# Patient Record
Sex: MALE | Race: WHITE | NOT HISPANIC OR LATINO | Employment: UNEMPLOYED | URBAN - METROPOLITAN AREA
[De-identification: names, ages, dates, MRNs, and addresses within clinical notes are randomized per-mention and may not be internally consistent; named-entity substitution may affect disease eponyms.]

---

## 2017-01-30 ENCOUNTER — CLINICAL SUPPORT (OUTPATIENT)
Dept: SMOKING CESSATION | Facility: CLINIC | Age: 47
End: 2017-01-30
Payer: COMMERCIAL

## 2017-01-30 DIAGNOSIS — F17.200 NICOTINE DEPENDENCE: Primary | ICD-10-CM

## 2017-01-30 PROCEDURE — 99407 BEHAV CHNG SMOKING > 10 MIN: CPT | Mod: S$GLB,,,

## 2017-08-14 ENCOUNTER — CLINICAL SUPPORT (OUTPATIENT)
Dept: SMOKING CESSATION | Facility: CLINIC | Age: 47
End: 2017-08-14
Payer: COMMERCIAL

## 2017-08-14 DIAGNOSIS — F17.200 NICOTINE DEPENDENCE: Primary | ICD-10-CM

## 2017-08-14 PROCEDURE — 99407 BEHAV CHNG SMOKING > 10 MIN: CPT | Mod: S$GLB,,,

## 2017-08-14 NOTE — PROGRESS NOTES
Pt not ready to return to the program at this time. Pt would like to be called next week to schedule an appointment. Will resolve this episode.

## 2017-10-19 ENCOUNTER — CLINICAL SUPPORT (OUTPATIENT)
Dept: SMOKING CESSATION | Facility: CLINIC | Age: 47
End: 2017-10-19
Payer: COMMERCIAL

## 2017-10-19 DIAGNOSIS — F17.210 MODERATE SMOKER (20 OR LESS PER DAY): Primary | ICD-10-CM

## 2017-10-19 PROCEDURE — 99999 PR PBB SHADOW E&M-EST. PATIENT-LVL I: CPT | Mod: PBBFAC,,,

## 2017-10-19 PROCEDURE — 99404 PREV MED CNSL INDIV APPRX 60: CPT | Mod: S$GLB,,,

## 2017-10-19 RX ORDER — BUPROPION HYDROCHLORIDE 150 MG/1
150 TABLET, EXTENDED RELEASE ORAL 2 TIMES DAILY
Qty: 60 TABLET | Refills: 0 | Status: SHIPPED | OUTPATIENT
Start: 2017-10-19 | End: 2018-10-19

## 2017-10-19 RX ORDER — IBUPROFEN 200 MG
1 TABLET ORAL DAILY
Qty: 14 PATCH | Refills: 0 | Status: SHIPPED | OUTPATIENT
Start: 2017-10-19 | End: 2017-11-02 | Stop reason: SDUPTHER

## 2017-11-02 ENCOUNTER — CLINICAL SUPPORT (OUTPATIENT)
Dept: SMOKING CESSATION | Facility: CLINIC | Age: 47
End: 2017-11-02
Payer: COMMERCIAL

## 2017-11-02 DIAGNOSIS — F17.200 NICOTINE DEPENDENCE: Primary | ICD-10-CM

## 2017-11-02 DIAGNOSIS — F17.210 MODERATE SMOKER (20 OR LESS PER DAY): ICD-10-CM

## 2017-11-02 PROCEDURE — 99999 PR PBB SHADOW E&M-EST. PATIENT-LVL I: CPT | Mod: PBBFAC,,,

## 2017-11-02 PROCEDURE — 90853 GROUP PSYCHOTHERAPY: CPT | Mod: S$GLB,,,

## 2017-11-02 RX ORDER — IBUPROFEN 200 MG
1 TABLET ORAL DAILY
Qty: 28 PATCH | Refills: 0 | Status: SHIPPED | OUTPATIENT
Start: 2017-11-02 | End: 2017-12-14 | Stop reason: SDUPTHER

## 2017-11-02 NOTE — Clinical Note
Patient reports smoking 10 cigarettes in 2 weeks. He has set a quit date of 11/23/17. He has quit several times in the past but always starts again usually once he is around other smokers. The patient remains on the prescribed tobacco cessation medication regimen of 150 mg Wellbutrin SR BID with a 21 mg nicotine patch QD without any negative side effects at this time.

## 2017-11-02 NOTE — PROGRESS NOTES
Smoking Cessation Group Session #3    Site: [place]  Date:  11/2/2017  Clinical Status of Patient: Outpatient   Length of Service and Code: 90 minutes - 45935   Number in Attendance: 4  Group Activities/Focus of Group: completion of TCRS (Tobacco Cessation Rating Scale) reviewed strategies, controlling environment, cues, triggers, new goals set. Introduced high risk situations with preparation interventions, caffeine similarities with withdrawal issues of habit and nicotine, Alcohol, Understanding urges, cravings, stress and relaxation. Open discussion with intervention discussion.    Target symptoms:  withdrawal and medication side effects             The following were rated moderate (3) to severe (4) on TCRS:       Moderate 3: none     Severe 4:   none  Patient's Response to Intervention: Patient reports smoking 10 cigarettes in 2 weeks. He has set a quit date of 11/23/17. He has quit several times in the past but always starts again usually once he is around other smokers. The patient remains on the prescribed tobacco cessation medication regimen of 150 mg Wellbutrin SR BID with a 21 mg nicotine patch QD without any negative side effects at this time.     Progress Toward Goals and Other Mental Status Changes: The patient denies any abnormal behavioral or mental changes at this time.     Plan: The patient will continue with group therapy sessions and medication monitoring by CTTS. Prescribed medication management will be by physician.     Return to Clinic: 1 week    Quit Date: none   Planned Quit Date: 11/23/17

## 2017-11-21 ENCOUNTER — CLINICAL SUPPORT (OUTPATIENT)
Dept: SMOKING CESSATION | Facility: CLINIC | Age: 47
End: 2017-11-21
Payer: COMMERCIAL

## 2017-11-21 DIAGNOSIS — F17.200 NICOTINE DEPENDENCE: Primary | ICD-10-CM

## 2017-11-21 PROCEDURE — 99407 BEHAV CHNG SMOKING > 10 MIN: CPT | Mod: S$GLB,,,

## 2017-12-12 ENCOUNTER — CLINICAL SUPPORT (OUTPATIENT)
Dept: SMOKING CESSATION | Facility: CLINIC | Age: 47
End: 2017-12-12
Payer: COMMERCIAL

## 2017-12-12 DIAGNOSIS — F17.200 NICOTINE DEPENDENCE: Primary | ICD-10-CM

## 2017-12-12 PROCEDURE — 99407 BEHAV CHNG SMOKING > 10 MIN: CPT | Mod: S$GLB,,,

## 2017-12-14 RX ORDER — IBUPROFEN 200 MG
1 TABLET ORAL DAILY
Qty: 28 PATCH | Refills: 0 | Status: SHIPPED | OUTPATIENT
Start: 2017-12-14

## 2018-06-07 ENCOUNTER — CLINICAL SUPPORT (OUTPATIENT)
Dept: SMOKING CESSATION | Facility: CLINIC | Age: 48
End: 2018-06-07
Payer: COMMERCIAL

## 2018-06-07 DIAGNOSIS — F17.200 NICOTINE DEPENDENCE: Primary | ICD-10-CM

## 2018-06-07 PROCEDURE — 99406 BEHAV CHNG SMOKING 3-10 MIN: CPT | Mod: S$GLB,,, | Performed by: INTERNAL MEDICINE

## 2018-06-07 NOTE — PROGRESS NOTES
Spoke with patient today in regard to smoking cessation progress for 3/6 month follow up, he states tobacco free. Commended patient on the accomplishment. Informed patient of benefit period, a follow up at 1 year, and contact information if any further help or support is needed. Will complete smart form for 3/6 month follow up on Quit attempt #2.

## 2018-10-31 ENCOUNTER — CLINICAL SUPPORT (OUTPATIENT)
Dept: SMOKING CESSATION | Facility: CLINIC | Age: 48
End: 2018-10-31
Payer: COMMERCIAL

## 2018-10-31 DIAGNOSIS — F17.200 NICOTINE DEPENDENCE: Primary | ICD-10-CM

## 2018-10-31 PROCEDURE — 99407 BEHAV CHNG SMOKING > 10 MIN: CPT | Mod: S$GLB,,,

## 2018-10-31 NOTE — PROGRESS NOTES
Called pt to f/u on his 12 month smoking cessation quit status. Pt stated he remains tobacco free. Congratulated patient on his hard work and success. Informed him of benefit period, phone follow ups, and contact information. Will complete and resolve quit #2 episode.